# Patient Record
Sex: MALE | Race: WHITE | Employment: UNEMPLOYED | URBAN - METROPOLITAN AREA
[De-identification: names, ages, dates, MRNs, and addresses within clinical notes are randomized per-mention and may not be internally consistent; named-entity substitution may affect disease eponyms.]

---

## 2021-02-03 ENCOUNTER — HOSPITAL ENCOUNTER (EMERGENCY)
Age: 41
Discharge: HOME | End: 2021-02-03
Attending: EMERGENCY MEDICINE
Payer: MEDICAID

## 2021-02-03 ENCOUNTER — APPOINTMENT (OUTPATIENT)
Dept: GENERAL RADIOLOGY | Age: 41
End: 2021-02-03
Payer: MEDICAID

## 2021-02-03 VITALS
RESPIRATION RATE: 16 BRPM | HEIGHT: 68 IN | BODY MASS INDEX: 23.49 KG/M2 | OXYGEN SATURATION: 100 % | TEMPERATURE: 98.1 F | WEIGHT: 155 LBS | HEART RATE: 75 BPM | SYSTOLIC BLOOD PRESSURE: 140 MMHG | DIASTOLIC BLOOD PRESSURE: 86 MMHG

## 2021-02-03 DIAGNOSIS — T14.8XXA PAIN DUE TO FRACTURE: Primary | ICD-10-CM

## 2021-02-03 PROCEDURE — 6370000000 HC RX 637 (ALT 250 FOR IP): Performed by: EMERGENCY MEDICINE

## 2021-02-03 PROCEDURE — 73610 X-RAY EXAM OF ANKLE: CPT

## 2021-02-03 PROCEDURE — 99283 EMERGENCY DEPT VISIT LOW MDM: CPT

## 2021-02-03 PROCEDURE — 73590 X-RAY EXAM OF LOWER LEG: CPT

## 2021-02-03 RX ORDER — OXYCODONE HYDROCHLORIDE AND ACETAMINOPHEN 5; 325 MG/1; MG/1
1-2 TABLET ORAL EVERY 6 HOURS PRN
Qty: 6 TABLET | Refills: 0 | Status: SHIPPED | OUTPATIENT
Start: 2021-02-03 | End: 2021-02-10

## 2021-02-03 RX ORDER — OXYCODONE HYDROCHLORIDE AND ACETAMINOPHEN 5; 325 MG/1; MG/1
1 TABLET ORAL ONCE
Status: COMPLETED | OUTPATIENT
Start: 2021-02-03 | End: 2021-02-03

## 2021-02-03 RX ORDER — LIDOCAINE 50 MG/G
1 PATCH TOPICAL DAILY
Qty: 12 PATCH | Refills: 0 | Status: SHIPPED | OUTPATIENT
Start: 2021-02-03

## 2021-02-03 RX ADMIN — OXYCODONE HYDROCHLORIDE AND ACETAMINOPHEN 1 TABLET: 5; 325 TABLET ORAL at 20:40

## 2021-02-03 ASSESSMENT — PAIN SCALES - GENERAL
PAINLEVEL_OUTOF10: 8
PAINLEVEL_OUTOF10: 8

## 2021-02-03 ASSESSMENT — PAIN DESCRIPTION - LOCATION: LOCATION: ANKLE

## 2021-02-03 ASSESSMENT — PAIN DESCRIPTION - PAIN TYPE: TYPE: ACUTE PAIN

## 2021-02-04 NOTE — ED NOTES
Pt dc/d with instructions and rx in stable condition to lobby per w/c. Home per ride.       Michael Pascual, ROMEO  02/03/21 8981

## 2021-02-04 NOTE — ED NOTES
Pt to ed with c/o slipped on ice with crutches, c/o rt ankle pain, recent surgery.  Exam per MD.      Pratik Guajardo RN  02/03/21 2045

## 2021-02-04 NOTE — ED PROVIDER NOTES
[address]   eMERGENCY dEPARTMENT eNCOUnter      Pt Name: Maria Luisa Pepe  MRN: [id number]  [place] 1980  Date of evaluation: 2/3/2021  Provider: Ayden Rosenbaum MD  PCP: Hung Son      CHIEF COMPLAINT       Leg pain    HISTORY OFPRESENT ILLNESS   (Location/Symptom, Timing/Onset, Context/Setting, Quality, Duration, Modifying Factors,Severity)  Note limiting factors. Maria Luisa Pepe is a 36 y.o. male presents after falling and reinjuring his already surgically repaired leg he also seems to express some concern about being seen by the orthopedic surgeons at [facility] where he says that they told him he would need a bone graft he is in a walking boot and ambulated in here without difficulty    Nursing Notes were all reviewed and agreed with or any disagreements were addressed  in the HPI. REVIEW OF SYSTEMS    (2-9 systems for level 4, 10 or more for level 5)     Review of Systems    Positives and Pertinent negatives as per HPI. Except as noted above in the ROS, all other systems were reviewed andnegative. PASTMEDICAL HISTORY     Past Medical History:   Diagnosis Date    Anxiety     Asthma     Bowel obstruction (HCC)     Depression     GERD (gastroesophageal reflux disease)     IBS (irritable bowel syndrome)     Mandible fracture (HCC)     PTSD (post-traumatic stress disorder)          SURGICAL HISTORY       Past Surgical History:   Procedure Laterality Date    ABDOMEN SURGERY  2002    bowel resection    FRACTURE SURGERY      mandibular    HERNIA REPAIR      R inguinal hernia         CURRENT MEDICATIONS       Previous Medications    BUSPIRONE (BUSPAR) 15 MG TABLET    Take 15 mg by mouth 3 times daily. GABAPENTIN (NEURONTIN) 400 MG CAPSULE    Take 800 mg by mouth 2 times daily. OMEPRAZOLE (PRILOSEC) 20 MG CAPSULE    Take 40 mg by mouth 2 times daily.        ALLERGIES     Clindamycin/lincomycin, Ibuprofen, Morphine, Morphine hcl, and Nsaids    FAMILY HISTORY     History reviewed. No pertinent family history. SOCIAL HISTORY       Social History     Socioeconomic History    Marital status: Single     Spouse name: None    Number of children: None    Years of education: None    Highest education level: None   Occupational History    None   Social Needs    Financial resource strain: None    Food insecurity     Worry: None     Inability: None    Transportation needs     Medical: None     Non-medical: None   Tobacco Use    Smoking status: Current Every Day Smoker     Packs/day: 0.20     Years: 15.00     Pack years: 3.00     Types: Cigarettes    Smokeless tobacco: Never Used   Substance and Sexual Activity    Alcohol use: Yes     Comment: occassionally    Drug use: No     Types: Marijuana     Comment: occassionally- not recently    Sexual activity: None   Lifestyle    Physical activity     Days per week: None     Minutes per session: None    Stress: None   Relationships    Social connections     Talks on phone: None     Gets together: None     Attends Restoration service: None     Active member of club or organization: None     Attends meetings of clubs or organizations: None     Relationship status: None    Intimate partner violence     Fear of current or ex partner: None     Emotionally abused: None     Physically abused: None     Forced sexual activity: None   Other Topics Concern    None   Social History Narrative    None       SCREENINGS      @FLOW([id number])@      PHYSICAL EXAM    (up to 7 for level 4, 8 or more for level 5)     ED Triage Vitals [02/03/21 1945]   BP Temp Temp Source Pulse Resp SpO2 Height Weight   (!) 140/86 98.1 °F (36.7 °C) Oral 75 16 100 % 5' 8\" (1.727 m) 155 lb (70.3 kg)       Physical Exam      General Appearance:  Alert, cooperative, no distress, appears stated age. Head:  Normocephalic, without obviousabnormality, atraumatic. Eyes:  conjunctiva/corneas clear, EOM's intact. Sclera anicteric.    ENT: and screw fixation of a distal fibular diaphyseal fracture. There is only some minimal bridging osseous callus formation identified along the lateral margin of the fibular fracture. IMPRESSION:   1. The patient has undergone medial blade plate and screw fixation of distal fibular diaphyseal oblique fracture. There is a persistent fracture cleft with no solid osseous bridging across the fracture line. No new fracture is identified. 2. There is plate and screw fixation of a distal fibular diaphyseal fracture. There is only some minimal bridging osseous callus formation identified along the lateral margin of the fibular fracture. PROCEDURES   Unless otherwise noted below, none     Procedures    *    CRITICAL CARE TIME   N/A      EMERGENCY DEPARTMENT COURSE and DIFFERENTIALDIAGNOSIS/MDM:   Vitals:    Vitals:    02/03/21 1945   BP: (!) 140/86   Pulse: 75   Resp: 16   Temp: 98.1 °F (36.7 °C)   TempSrc: Oral   SpO2: 100%   Weight: 155 lb (70.3 kg)   Height: 5' 8\" (1.727 m)       Patient was given thefollowing medications:  Medications   oxyCODONE-acetaminophen (PERCOCET) 5-325 MG per tablet 1 tablet (1 tablet Oral Given 2/3/21 2040)           The patient tolerated their visit well. The patient and / or the familywere informed of the results of any tests, a time was given to answer questions. FINAL IMPRESSION      1. Pain due to fracture          DISPOSITION/PLAN   DISPOSITION Decision To Discharge 02/03/2021 08:39:47 PM      PATIENT REFERRED TO:  Isidra Tomlin MD  [address] [place] [address]  [phone]    In 3 days        DISCHARGE MEDICATIONS:  New Prescriptions    LIDOCAINE (LIDODERM) 5 %    Place 1 patch onto the skin daily 12 hours on, 12 hours off. OXYCODONE-ACETAMINOPHEN (PERCOCET) 5-325 MG PER TABLET    Take 1-2 tablets by mouth every 6 hours as needed for Pain for up to 7 days.        DISCONTINUED MEDICATIONS:  Discontinued Medications    ALBUTEROL (PROVENTIL HFA;VENTOLIN HFA) 108 (90 BASE) MCG/ACT INHALER    Inhale 2 puffs into the lungs every 6 hours as needed. BUPROPION (WELLBUTRIN XL) 300 MG XL TABLET    Take 300 mg by mouth every morning. DIPHENHYDRAMINE (BENADRYL) 50 MG CAPSULE    Take 50 mg by mouth nightly as needed for Itching. OLANZAPINE (ZYPREXA) 5 MG TABLET    Take 5 mg by mouth 2 times daily. PANTOPRAZOLE SODIUM (PROTONIX) 40 MG PACK PACKET    Take 40 mg by mouth daily. QUETIAPINE (SEROQUEL) 400 MG TABLET    Take 400 mg by mouth nightly. SERTRALINE (ZOLOFT) 100 MG TABLET    Take 150 mg by mouth daily.               (Please note that portions of this note were completed with a voice recognition program.  Efforts were made to edit the dictations but occasionally words are mis-transcribed.)    Marialuisa Shafer MD (electronically signed)      Marialuisa Shafer MD  02/03/21 204

## 2022-01-28 ENCOUNTER — HOSPITAL ENCOUNTER (EMERGENCY)
Age: 42
Discharge: HOME | End: 2022-01-28
Attending: EMERGENCY MEDICINE
Payer: MEDICAID

## 2022-01-28 ENCOUNTER — APPOINTMENT (OUTPATIENT)
Dept: GENERAL RADIOLOGY | Age: 42
End: 2022-01-28
Payer: MEDICAID

## 2022-01-28 VITALS
RESPIRATION RATE: 14 BRPM | OXYGEN SATURATION: 98 % | TEMPERATURE: 98.4 F | HEIGHT: 68 IN | DIASTOLIC BLOOD PRESSURE: 106 MMHG | HEART RATE: 98 BPM | BODY MASS INDEX: 24.25 KG/M2 | SYSTOLIC BLOOD PRESSURE: 185 MMHG | WEIGHT: 160 LBS

## 2022-01-28 DIAGNOSIS — S99.911A INJURY OF RIGHT ANKLE, INITIAL ENCOUNTER: Primary | ICD-10-CM

## 2022-01-28 DIAGNOSIS — T85.848A PAIN FROM IMPLANTED HARDWARE, INITIAL ENCOUNTER: ICD-10-CM

## 2022-01-28 PROCEDURE — 99284 EMERGENCY DEPT VISIT MOD MDM: CPT

## 2022-01-28 PROCEDURE — 73502 X-RAY EXAM HIP UNI 2-3 VIEWS: CPT

## 2022-01-28 PROCEDURE — 6360000002 HC RX W HCPCS: Performed by: EMERGENCY MEDICINE

## 2022-01-28 PROCEDURE — 6370000000 HC RX 637 (ALT 250 FOR IP): Performed by: EMERGENCY MEDICINE

## 2022-01-28 PROCEDURE — 73610 X-RAY EXAM OF ANKLE: CPT

## 2022-01-28 PROCEDURE — 96372 THER/PROPH/DIAG INJ SC/IM: CPT

## 2022-01-28 RX ORDER — OMEPRAZOLE 20 MG/1
20 CAPSULE, DELAYED RELEASE ORAL
Qty: 60 CAPSULE | Refills: 0 | Status: SHIPPED | OUTPATIENT
Start: 2022-01-28

## 2022-01-28 RX ORDER — FAMOTIDINE 20 MG/1
20 TABLET, FILM COATED ORAL ONCE
Status: COMPLETED | OUTPATIENT
Start: 2022-01-28 | End: 2022-01-28

## 2022-01-28 RX ORDER — OXYCODONE HYDROCHLORIDE AND ACETAMINOPHEN 5; 325 MG/1; MG/1
1 TABLET ORAL ONCE
Status: COMPLETED | OUTPATIENT
Start: 2022-01-28 | End: 2022-01-28

## 2022-01-28 RX ORDER — KETOROLAC TROMETHAMINE 30 MG/ML
15 INJECTION, SOLUTION INTRAMUSCULAR; INTRAVENOUS ONCE
Status: DISCONTINUED | OUTPATIENT
Start: 2022-01-28 | End: 2022-01-28 | Stop reason: HOSPADM

## 2022-01-28 RX ORDER — LIDOCAINE 4 G/G
1 PATCH TOPICAL ONCE
Status: DISCONTINUED | OUTPATIENT
Start: 2022-01-28 | End: 2022-01-28 | Stop reason: HOSPADM

## 2022-01-28 RX ORDER — TRAMADOL HYDROCHLORIDE 50 MG/1
50 TABLET ORAL EVERY 6 HOURS PRN
Qty: 8 TABLET | Refills: 0 | Status: SHIPPED | OUTPATIENT
Start: 2022-01-28 | End: 2022-01-31

## 2022-01-28 RX ORDER — ONDANSETRON 4 MG/1
4 TABLET, ORALLY DISINTEGRATING ORAL EVERY 8 HOURS PRN
Qty: 20 TABLET | Refills: 0 | Status: SHIPPED | OUTPATIENT
Start: 2022-01-28

## 2022-01-28 RX ORDER — TIZANIDINE 4 MG/1
4 TABLET ORAL EVERY 6 HOURS PRN
Qty: 20 TABLET | Refills: 0 | Status: SHIPPED | OUTPATIENT
Start: 2022-01-28

## 2022-01-28 RX ORDER — NAPROXEN 500 MG/1
500 TABLET ORAL 2 TIMES DAILY WITH MEALS
Qty: 60 TABLET | Refills: 5 | Status: SHIPPED | OUTPATIENT
Start: 2022-01-28

## 2022-01-28 RX ORDER — ORPHENADRINE CITRATE 30 MG/ML
60 INJECTION INTRAMUSCULAR; INTRAVENOUS ONCE
Status: COMPLETED | OUTPATIENT
Start: 2022-01-28 | End: 2022-01-28

## 2022-01-28 RX ADMIN — FAMOTIDINE 20 MG: 20 TABLET ORAL at 03:28

## 2022-01-28 RX ADMIN — ORPHENADRINE CITRATE 60 MG: 30 INJECTION INTRAMUSCULAR; INTRAVENOUS at 03:28

## 2022-01-28 RX ADMIN — OXYCODONE AND ACETAMINOPHEN 1 TABLET: 5; 325 TABLET ORAL at 04:18

## 2022-01-28 ASSESSMENT — PAIN DESCRIPTION - FREQUENCY: FREQUENCY: CONTINUOUS

## 2022-01-28 ASSESSMENT — PAIN SCALES - GENERAL
PAINLEVEL_OUTOF10: 10

## 2022-01-28 ASSESSMENT — PAIN DESCRIPTION - ORIENTATION: ORIENTATION: RIGHT

## 2022-01-28 ASSESSMENT — PAIN DESCRIPTION - DESCRIPTORS: DESCRIPTORS: THROBBING;SHOOTING

## 2022-01-28 ASSESSMENT — PAIN DESCRIPTION - LOCATION: LOCATION: BACK;LEG;ANKLE

## 2022-01-28 ASSESSMENT — PAIN DESCRIPTION - PAIN TYPE: TYPE: ACUTE PAIN

## 2022-01-28 NOTE — ED NOTES
Patient prepared for and ready to be discharged. Patient discharged at this time in no acute distress after verbalizing understanding of discharge instructions. Patient left after receiving After Visit Summary instructions.         Maricarmen Pierre RN  01/28/22 3659

## 2022-01-29 ASSESSMENT — ENCOUNTER SYMPTOMS
BACK PAIN: 1
COLOR CHANGE: 0

## 2022-01-29 NOTE — ED PROVIDER NOTES
[address]  [facility]      Pt Name: Paul Villa  MRN: [id number]  [place] 1980  Date ofevaluation: 1/28/2022  Provider: Vishal Mendoza MD    CHIEF COMPLAINT       Chief Complaint   Patient presents with    Back Pain    Fall    Leg Pain         HISTORY OF PRESENT ILLNESS   (Location/Symptom, Timing/Onset,Context/Setting, Quality, Duration, Modifying Factors, Severity)  Note limiting factors. Paul Villa is a 39 y.o. male  who  has a past medical history of Anxiety, Asthma, Bowel obstruction ([address] Depression, GERD (gastroesophageal reflux disease), IBS (irritable bowel syndrome), Mandible fracture ([address] and PTSD (post-traumatic stress disorder). who presents to the emergency department for evaluation of right ankle pain and left hip pain after fall. Patient reports that he was walking down steps and slipped causing him to fall and injure his left hip and right ankle. Reports a history of previous right ankle fracture. Reports that he has been on the bear weight but does worsen the pain. Denies numbness weakness or loss sensation. Denies changes in bowel or urine function. Denies head injury loss of consciousness. Reports he did take over-the-counter occasions with improvement of his symptoms. Patient reports that injury occurred around 1600 this afternoon. Patient was previously being seen by orthopedics at [facility] due to a complicated right ankle fracture. Reports that he has not been able to follow-up because he is recently incarcerated and only recently was able to obtain insurance. HPI    NursingNotes were reviewed. REVIEW OF SYSTEMS    (2-9 systems for level 4, 10 or more for level 5)     Review of Systems   Musculoskeletal: Positive for arthralgias, back pain and myalgias. Negative for neck pain and neck stiffness. Skin: Negative for color change, pallor and wound.    Neurological: Negative for syncope, light-headedness and numbness. Except as noted above the remainder of the review of systems was reviewed and negative. PAST MEDICAL HISTORY     Past Medical History:   Diagnosis Date    Anxiety     Asthma     Bowel obstruction (HCC)     Depression     GERD (gastroesophageal reflux disease)     IBS (irritable bowel syndrome)     Mandible fracture (HCC)     PTSD (post-traumatic stress disorder)          SURGICALHISTORY       Past Surgical History:   Procedure Laterality Date    ABDOMEN SURGERY  2002    bowel resection    FRACTURE SURGERY      mandibular    HERNIA REPAIR      R inguinal hernia         CURRENT MEDICATIONS       Discharge Medication List as of 1/28/2022  4:23 AM      CONTINUE these medications which have NOT CHANGED    Details   lidocaine (LIDODERM) 5 % Place 1 patch onto the skin daily 12 hours on, 12 hours off., Disp-12 patch, R-0Normal      busPIRone (BUSPAR) 15 MG tablet Take 15 mg by mouth 3 times daily. gabapentin (NEURONTIN) 400 MG capsule Take 800 mg by mouth 2 times daily. !! omeprazole (PRILOSEC) 20 MG capsule Take 40 mg by mouth 2 times daily. !! - Potential duplicate medications found. Please discuss with provider. Clindamycin/lincomycin, Ibuprofen, Morphine, Morphine hcl, and Nsaids    FAMILY HISTORY     History reviewed. No pertinent family history.        SOCIAL HISTORY       Social History     Socioeconomic History    Marital status: Single     Spouse name: None    Number of children: None    Years of education: None    Highest education level: None   Occupational History    None   Tobacco Use    Smoking status: Current Every Day Smoker     Packs/day: 0.20     Years: 15.00     Pack years: 3.00     Types: Cigarettes    Smokeless tobacco: Never Used   Substance and Sexual Activity    Alcohol use: Yes     Comment: occassionally    Drug use: No     Types: Marijuana (Weed)     Comment: occassionally- not recently    Sexual activity: None   Other Topics Concern    None   Social History Narrative    None     Social Determinants of Health     Financial Resource Strain:     Difficulty of Paying Living Expenses: Not on file   Food Insecurity:     Worried About Running Out of Food in the Last Year: Not on file    Jeanna of Food in the Last Year: Not on file   Transportation Needs:     Lack of Transportation (Medical): Not on file    Lack of Transportation (Non-Medical): Not on file   Physical Activity:     Days of Exercise per Week: Not on file    Minutes of Exercise per Session: Not on file   Stress:     Feeling of Stress : Not on file   Social Connections:     Frequency of Communication with Friends and Family: Not on file    Frequency of Social Gatherings with Friends and Family: Not on file    Attends Latter-day Services: Not on file    Active Member of [address] [place] [address] [employer] or Organizations: Not on file    Attends Club or Organization Meetings: Not on file    Marital Status: Not on file   Intimate Partner Violence:     Fear of Current or Ex-Partner: Not on file    Emotionally Abused: Not on file    Physically Abused: Not on file    Sexually Abused: Not on file   Housing Stability:     Unable to Pay for Housing in the Last Year: Not on file    Number of [place] in the Last Year: Not on file    Unstable Housing in the Last Year: Not on file       SCREENINGS             PHYSICAL EXAM    (up to 7 for level 4, 8 or more for level 5)     ED Triage Vitals [01/28/22 0250]   BP Temp Temp Source Pulse Resp SpO2 Height Weight   (!) 185/106 98.4 °F (36.9 °C) Oral 98 14 98 % 5' 8\" (1.727 m) 160 lb (72.6 kg)       Physical Exam  Vitals and nursing note reviewed. Constitutional:       General: He is not in acute distress. Appearance: He is well-developed. HENT:      Head: Normocephalic and atraumatic. Eyes:      Conjunctiva/sclera: Conjunctivae normal.   Neck:      Trachea: No tracheal deviation.    Cardiovascular:      Rate and Rhythm: Normal rate and regular rhythm. Pulmonary:      Effort: Pulmonary effort is normal.      Breath sounds: Normal breath sounds. No wheezing or rales. Abdominal:      General: There is no distension. Palpations: Abdomen is soft. Tenderness: There is no abdominal tenderness. Musculoskeletal:         General: Swelling, tenderness and signs of injury present. No deformity. Normal range of motion. Cervical back: Normal range of motion. Right lower leg: Edema present. Legs:    Skin:     General: Skin is warm and dry. Findings: No bruising, erythema or lesion. Neurological:      Mental Status: He is alert and oriented to person, place, and time. RESULTS     EKG: All EKG's are interpreted by the Emergency Department Physician who either signs or Co-signsthis chart in the absence of a cardiologist.        RADIOLOGY:   Aiyana Ruiz such as CT, Ultrasound and MRI are read by the radiologist. Plain radiographic images are visualized and preliminarily interpreted by the emergency physician with the below findings:        Interpretation per the Radiologist below, if available at the time ofthis note:    XR HIP 2-3 VW W PELVIS LEFT   Final Result     No evidence of acute fracture or dislocation. XR ANKLE RIGHT (MIN 3 VIEWS)   Final Result   1. Plate and screw fixation of a distal tibial fracture. New    finding of 3 fractured and displaced tibial diaphyseal screws. Widened, irregular appearance of the fracture site. Somewhat    similar although more irregularity of the hazy fracture edges. On    the lateral view, increased angulation and mild displacement at    the tibial fracture site. Lucency around the tibial plate. Findings may reflect loosening/infection. 2.  Plate and screw fixation of distal fibular diaphyseal fracture    with interval increased callus and bony bridging. Fibular    hardware appears intact.                 ED BEDSIDE ULTRASOUND:   Performed by ED Physician - none    LABS:  Labs Reviewed - No data to display    All other labs were within normal range or not returned as of this dictation. EMERGENCY DEPARTMENT COURSE and DIFFERENTIAL DIAGNOSIS/MDM:   Vitals:    Vitals:    01/28/22 0250   BP: (!) 185/106   Pulse: 98   Resp: 14   Temp: 98.4 °F (36.9 °C)   TempSrc: Oral   SpO2: 98%   Weight: 160 lb (72.6 kg)   Height: 5' 8\" (1.727 m)       Patient was given thefollowing medications:  Medications   famotidine (PEPCID) tablet 20 mg (20 mg Oral Given 1/28/22 0328)   orphenadrine (NORFLEX) injection 60 mg (60 mg IntraMUSCular Given 1/28/22 0328)   oxyCODONE-acetaminophen (PERCOCET) 5-325 MG per tablet 1 tablet (1 tablet Oral Given 1/28/22 0418)       ED COURSE & MEDICAL DECISION MAKING    Pertinent Labs & Imaging studies reviewed. (See chart for details)   -  Patient seen and evaluated in the emergency department. -  Triage and nursing notes reviewed and incorporated. -  Old chart records reviewed and incorporated. -  Differential diagnosis includes: Differential diagnosis: includes but not limited to Arterial Injury/Ischemia, Fracture, Dislocation, Infection, Compartment Syndrome, Neurologic Deficit/Injury. -  Work-up included:  See above  -  ED treatment included: See above  -  Results discussed with patient. Patient presents the ED for evaluation of right ankle pain and left hip pain after a fall. On exam patient does have tenderness to the proximal portion of the lateral aspect of the buttock. No midline lumbar tenderness. No signs or symptoms concerning for cord compression. Patient does have a diffusely tender ankle. PT and DP pulses intact. Capillary refill less than 3 seconds in the digits. Previous scar from internal fixation noted. imaging studies show fractures of previous implanted screws as of reported haziness around hardware. Suspect secondary to recent trauma. Patient placed in a walking boot and on crutches.   He was provided with analgesics. Patient had refused Toradol in the emergency department claiming that it causes him to feel sick and he has a history of ulcers. He was provided with Pepcid and antiemetics. Patient was given 1 Percocet the emergency department. He reports he is recovering addict. Patient was written for tramadol. He reports that tramadol upsets his stomach and causes nausea was also provided with omeprazole Pepcid and Zofran. He left without the tramadol prescription. He was encouraged to follow-up with the orthopedist at [facility] who had seen him previously where he has establish care. Patient feels improved on reevaluation. Symptomatic treatment with expectant management discussed with the patient and they and/or family members present are amenable to treatment plan and outpatient follow-up. Strict return precautions were discussed with the patient and those present. They demonstrated understanding of when to return to the emergency department for new or worsening symptoms. .  The patient is agreeable with plan of care and disposition. REASSESSMENT          CRITICAL CARE TIME   Total Critical Care time was 0 minutes, excluding separately reportable procedures. There was a high probability of clinically significant/life threatening deterioration in the patient's condition which required my urgent intervention. CONSULTS:  None    PROCEDURES:  Unless otherwise noted below, none     Procedures    FINAL IMPRESSION      1. Injury of right ankle, initial encounter    2.  Pain from implanted hardware, initial encounter          DISPOSITION/PLAN   DISPOSITION Decision To Discharge 01/28/2022 04:06:46 AM      PATIENT REFERREDTO:  Maria Alejandra Munroe    Schedule an appointment as soon as possible for a visit   As needed    [address] Pre-Services  [phone]  Schedule an appointment as soon as possible for a visit   As needed    Maria Alejandra Munroe    Schedule an appointment as soon as possible for a visit   As needed    Jose Daniel Funes MD  [address] [facility] [address] [facility] [address]  [address]            DISCHARGEMEDICATIONS:  Discharge Medication List as of 1/28/2022  4:23 AM      START taking these medications    Details   traMADol (ULTRAM) 50 MG tablet Take 1 tablet by mouth every 6 hours as needed for Pain for up to 3 days. Intended supply: 3 days. Take lowest dose possible to manage pain, Disp-8 tablet, R-0Print      naproxen (NAPROSYN) 500 MG tablet Take 1 tablet by mouth 2 times daily (with meals), Disp-60 tablet, R-5Normal      !! omeprazole (PRILOSEC) 20 MG delayed release capsule Take 1 capsule by mouth 2 times daily (before meals), Disp-60 capsule, R-0Normal      ondansetron (ZOFRAN ODT) 4 MG disintegrating tablet Take 1 tablet by mouth every 8 hours as needed for Nausea, Disp-20 tablet, R-0Normal      tiZANidine (ZANAFLEX) 4 MG tablet Take 1 tablet by mouth every 6 hours as needed (muscle aches), Disp-20 tablet, R-0Normal       !! - Potential duplicate medications found. Please discuss with provider.              (Please note that portions of this note were completed with a voice recognition program.  Efforts were made to edit the dictations but occasionally words are mis-transcribed.)    Amy Dozier MD (electronically signed)  Attending Emergency Physician          Amy Dozier MD  01/29/22 [address], [place]  01/29/22 2009

## 2022-06-09 ENCOUNTER — HOSPITAL ENCOUNTER (EMERGENCY)
Age: 42
Discharge: HOME | End: 2022-06-09
Attending: EMERGENCY MEDICINE
Payer: MEDICAID

## 2022-06-09 VITALS
SYSTOLIC BLOOD PRESSURE: 148 MMHG | DIASTOLIC BLOOD PRESSURE: 106 MMHG | RESPIRATION RATE: 16 BRPM | OXYGEN SATURATION: 99 %

## 2022-06-09 DIAGNOSIS — R21 RASH: Primary | ICD-10-CM

## 2022-06-09 PROCEDURE — 6370000000 HC RX 637 (ALT 250 FOR IP): Performed by: EMERGENCY MEDICINE

## 2022-06-09 PROCEDURE — 99283 EMERGENCY DEPT VISIT LOW MDM: CPT

## 2022-06-09 RX ORDER — METRONIDAZOLE 500 MG/1
500 TABLET ORAL 2 TIMES DAILY
Qty: 20 TABLET | Refills: 0 | Status: SHIPPED | OUTPATIENT
Start: 2022-06-09 | End: 2022-06-19

## 2022-06-09 RX ORDER — NAPROXEN 500 MG/1
500 TABLET ORAL 2 TIMES DAILY
Qty: 20 TABLET | Refills: 0 | Status: SHIPPED | OUTPATIENT
Start: 2022-06-09 | End: 2022-06-19

## 2022-06-09 RX ORDER — NAPROXEN 500 MG/1
500 TABLET ORAL ONCE
Status: DISCONTINUED | OUTPATIENT
Start: 2022-06-09 | End: 2022-06-09 | Stop reason: HOSPADM

## 2022-06-09 RX ORDER — METRONIDAZOLE 500 MG/1
500 TABLET ORAL ONCE
Status: COMPLETED | OUTPATIENT
Start: 2022-06-09 | End: 2022-06-09

## 2022-06-09 RX ADMIN — METRONIDAZOLE 500 MG: 500 TABLET ORAL at 04:32

## 2022-06-09 NOTE — ED NOTES
Patient given d/c instructions. Patient states that this is a \"[facility]\" Emphasis placed on getting f/u with PCP. Reviewed medication with pt and given instructions on use. Pt ambulated to lobby with steady gait.      Tamar Graf RN  06/09/22 9909

## 2022-06-09 NOTE — ED NOTES
Patient states that he pulled a worm out of his nose earlier today. + multiple open sores on body, patient continually scratching and squeezing them.      Praveena Hummel RN  06/09/22 8348

## 2022-06-09 NOTE — ED PROVIDER NOTES
[facility]   eMERGENCY dEPARTMENT eNCOUnter      Pt Name: Emili Sharpe  MRN: [id number]  [place] 1980  Date of evaluation: 6/9/2022  Provider: Aviva Sawyer MD  PCP: No primary care provider on file. CHIEF COMPLAINT       Chief Complaint   Patient presents with    Rash       HISTORY OFPRESENT ILLNESS   (Location/Symptom, Timing/Onset, Context/Setting, Quality, Duration, Modifying Factors,Severity)  Note limiting factors. Emili Sharpe is a 39 y.o. male  has a chronically injured right ankle and has had surgery on that ankle and he was walking and was complaining that he had pain he denies any new injury does not want any x-rays he also thinks that he has a parasitic infection and thinks that he pulled a parasite out of his nose    Nursing Notes were all reviewed and agreed with or any disagreements were addressed  in the HPI. REVIEW OF SYSTEMS    (2-9 systems for level 4, 10 or more for level 5)     Review of Systems    Positives and Pertinent negatives as per HPI. Except as noted above in the ROS, all other systems were reviewed andnegative. PASTMEDICAL HISTORY   No past medical history on file. SURGICAL HISTORY     No past surgical history on file. CURRENT MEDICATIONS       Previous Medications    No medications on file       ALLERGIES     Patient has no known allergies. FAMILY HISTORY     No family history on file.        SOCIAL HISTORY       Social History     Socioeconomic History    Marital status: Not on file     Spouse name: Not on file    Number of children: Not on file    Years of education: Not on file    Highest education level: Not on file   Occupational History    Not on file   Tobacco Use    Smoking status: Not on file    Smokeless tobacco: Not on file   Substance and Sexual Activity    Alcohol use: Not on file    Drug use: Not on file    Sexual activity: Not on file   Other Topics Concern    Not on file   Social History Narrative    Not on file     Social Determinants of Health     Financial Resource Strain:     Difficulty of Paying Living Expenses: Not on file   Food Insecurity:     Worried About Running Out of Food in the Last Year: Not on file    Ugo of Food in the Last Year: Not on file   Transportation Needs:     Lack of Transportation (Medical): Not on file    Lack of Transportation (Non-Medical): Not on file   Physical Activity:     Days of Exercise per Week: Not on file    Minutes of Exercise per Session: Not on file   Stress:     Feeling of Stress : Not on file   Social Connections:     Frequency of Communication with Friends and Family: Not on file    Frequency of Social Gatherings with Friends and Family: Not on file    Attends Gnosticist Services: Not on file    Active Member of [address] [place] [address] [email or web address] or Organizations: Not on file    Attends Club or Organization Meetings: Not on file    Marital Status: Not on file   Intimate Partner Violence:     Fear of Current or Ex-Partner: Not on file    Emotionally Abused: Not on file    Physically Abused: Not on file    Sexually Abused: Not on file   Housing Stability:     Unable to Pay for Housing in the Last Year: Not on file    Number of [place] in the Last Year: Not on file    Unstable Housing in the Last Year: Not on file       SCREENINGS    Arpan Coma Scale  Eye Opening: Spontaneous  Best Verbal Response: Oriented  Best Motor Response: Obeys commands  Arpan Coma Scale Score: 15 @FLOW([id number])@      PHYSICAL EXAM    (up to 7 for level 4, 8 or more for level 5)     ED Triage Vitals [06/09/22 0418]   BP Temp Temp src Pulse Resp SpO2 Height Weight   (!) 148/106 -- -- -- 16 99 % -- --       Physical Exam      General Appearance:  Alert, cooperative, no distress, appears stated age. Head:  Normocephalic, without obviousabnormality, atraumatic. Eyes:  conjunctiva/corneas clear, EOM's intact. Sclera anicteric.    ENT: Mucous membranes moist.   Neck: Supple, symmetrical, trachea midline, no adenopathy. No jugular venous distention. Lungs:   Clear to auscultation bilaterally, respirationsunlabored. No rales, rhonchi or wheezes. Chest Wall:  No tenderness. Heart:  Regular rate and rhythm, S1 and S2 normal, no murmur, rub or gallop. Abdomen:   Soft, non-tender, bowel sounds active,   no masses, no organomegaly. Extremities: No edema, cords or calf tenderness. Full range of motion. Pulses: 2+ and symmetric   Skin: Turgor is normal, no rashes or lesions. Neurologic: Alert and oriented X 3. No focal findings. Motor grossly normal.  Speech clear, no drift, CN III-XII grossly intact,        DIAGNOSTIC RESULTS   LABS:    Labs Reviewed - No data to display    All other labs were within normal range or not returned as of this dictation. EKG: All EKG's are interpreted by the Emergency Department Physician who eithersigns or Co-signs this chart in the absence of a cardiologist.        RADIOLOGY:   Non-plain film images such as CT, Ultrasound and MRI are read by the radiologist. Plain radiographic images are visualized by myself. *    Interpretation per the Radiologist below, if available at the time of this note:    No orders to display         PROCEDURES   Unless otherwise noted below, none     Procedures    *    CRITICAL CARE TIME   N/A      EMERGENCY DEPARTMENT COURSE and DIFFERENTIALDIAGNOSIS/MDM:   Vitals:    Vitals:    06/09/22 0418   BP: (!) 148/106   Resp: 16   SpO2: 99%       Patient was given thefollowing medications:  Medications   naproxen (NAPROSYN) tablet 500 mg (has no administration in time range)   metroNIDAZOLE (FLAGYL) tablet 500 mg (has no administration in time range)           The patient tolerated their visit well. The patient and / or the familywere informed of the results of any tests, a time was given to answer questions. FINAL IMPRESSION      1.  Rash          DISPOSITION/PLAN   DISPOSITION Discharge - Pending Orders Complete 06/09/2022 04:22:02 AM  Plan will be for discharge with pain medication and Flagyl    PATIENT REFERRED TO:  Dermatology clinic at [address] [place] [address] Dr:  New Prescriptions    METRONIDAZOLE (FLAGYL) 500 MG TABLET    Take 1 tablet by mouth 2 times daily for 10 days    NAPROXEN (NAPROSYN) 500 MG TABLET    Take 1 tablet by mouth 2 times daily for 20 doses       DISCONTINUED MEDICATIONS:  Discontinued Medications    No medications on file              (Please note that portions of this note were completed with a voice recognition program.  Efforts were made to edit the dictations but occasionally words are mis-transcribed.)    Beti Madrigal MD (electronically signed)      Beti Madrigal MD  06/09/22 700 Mina Kimball MD  06/09/22 0046

## 2022-07-29 ENCOUNTER — APPOINTMENT (OUTPATIENT)
Dept: GENERAL RADIOLOGY | Age: 42
End: 2022-07-29
Payer: MEDICAID

## 2022-07-29 ENCOUNTER — APPOINTMENT (OUTPATIENT)
Dept: CT IMAGING | Age: 42
End: 2022-07-29
Payer: MEDICAID

## 2022-07-29 ENCOUNTER — HOSPITAL ENCOUNTER (EMERGENCY)
Age: 42
Discharge: HOME | End: 2022-07-29
Attending: STUDENT IN AN ORGANIZED HEALTH CARE EDUCATION/TRAINING PROGRAM
Payer: MEDICAID

## 2022-07-29 VITALS
TEMPERATURE: 98.2 F | HEART RATE: 58 BPM | SYSTOLIC BLOOD PRESSURE: 100 MMHG | OXYGEN SATURATION: 100 % | RESPIRATION RATE: 13 BRPM | DIASTOLIC BLOOD PRESSURE: 61 MMHG

## 2022-07-29 DIAGNOSIS — R10.9 ABDOMINAL PAIN, UNSPECIFIED ABDOMINAL LOCATION: Primary | ICD-10-CM

## 2022-07-29 LAB
ALBUMIN SERPL-MCNC: 3.6 G/DL (ref 3.4–5)
ALP BLD-CCNC: 91 U/L (ref 40–129)
ALT SERPL-CCNC: 18 U/L (ref 10–40)
ANION GAP SERPL CALCULATED.3IONS-SCNC: 10 MMOL/L (ref 3–16)
AST SERPL-CCNC: 24 U/L (ref 15–37)
BASOPHILS ABSOLUTE: 0.1 K/UL (ref 0–0.2)
BASOPHILS RELATIVE PERCENT: 1.1 %
BILIRUB SERPL-MCNC: <0.2 MG/DL (ref 0–1)
BILIRUBIN DIRECT: <0.2 MG/DL (ref 0–0.3)
BILIRUBIN URINE: NEGATIVE
BILIRUBIN, INDIRECT: NORMAL MG/DL (ref 0–1)
BLOOD, URINE: NEGATIVE
BUN BLDV-MCNC: 17 MG/DL (ref 7–20)
CALCIUM SERPL-MCNC: 9.1 MG/DL (ref 8.3–10.6)
CHLORIDE BLD-SCNC: 102 MMOL/L (ref 99–110)
CLARITY: CLEAR
CO2: 25 MMOL/L (ref 21–32)
COLOR: YELLOW
CREAT SERPL-MCNC: 0.7 MG/DL (ref 0.9–1.3)
EOSINOPHILS ABSOLUTE: 0.1 K/UL (ref 0–0.6)
EOSINOPHILS RELATIVE PERCENT: 1.2 %
GFR AFRICAN AMERICAN: >60
GFR NON-AFRICAN AMERICAN: >60
GLUCOSE BLD-MCNC: 75 MG/DL (ref 70–99)
GLUCOSE URINE: NEGATIVE MG/DL
HCT VFR BLD CALC: 37 % (ref 40.5–52.5)
HEMOGLOBIN: 13 G/DL (ref 13.5–17.5)
INFLUENZA A: NOT DETECTED
INFLUENZA B: NOT DETECTED
KETONES, URINE: NEGATIVE MG/DL
LEUKOCYTE ESTERASE, URINE: NEGATIVE
LIPASE: 17 U/L (ref 13–60)
LYMPHOCYTES ABSOLUTE: 2 K/UL (ref 1–5.1)
LYMPHOCYTES RELATIVE PERCENT: 30.3 %
MCH RBC QN AUTO: 30 PG (ref 26–34)
MCHC RBC AUTO-ENTMCNC: 35.1 G/DL (ref 31–36)
MCV RBC AUTO: 85.5 FL (ref 80–100)
MICROSCOPIC EXAMINATION: NORMAL
MONOCYTES ABSOLUTE: 0.5 K/UL (ref 0–1.3)
MONOCYTES RELATIVE PERCENT: 8.2 %
NEUTROPHILS ABSOLUTE: 3.9 K/UL (ref 1.7–7.7)
NEUTROPHILS RELATIVE PERCENT: 59.2 %
NITRITE, URINE: NEGATIVE
PDW BLD-RTO: 13.4 % (ref 12.4–15.4)
PH UA: 6 (ref 5–8)
PLATELET # BLD: 252 K/UL (ref 135–450)
PMV BLD AUTO: 8.2 FL (ref 5–10.5)
POTASSIUM SERPL-SCNC: 4.6 MMOL/L (ref 3.5–5.1)
PROTEIN UA: NEGATIVE MG/DL
RBC # BLD: 4.33 M/UL (ref 4.2–5.9)
SARS-COV-2 RNA, RT PCR: NOT DETECTED
SODIUM BLD-SCNC: 137 MMOL/L (ref 136–145)
SPECIFIC GRAVITY UA: >=1.03 (ref 1–1.03)
TOTAL PROTEIN: 6.9 G/DL (ref 6.4–8.2)
TROPONIN: <0.01 NG/ML
URINE TYPE: NORMAL
UROBILINOGEN, URINE: 0.2 E.U./DL
WBC # BLD: 6.6 K/UL (ref 4–11)

## 2022-07-29 PROCEDURE — 84484 ASSAY OF TROPONIN QUANT: CPT

## 2022-07-29 PROCEDURE — 85025 COMPLETE CBC W/AUTO DIFF WBC: CPT

## 2022-07-29 PROCEDURE — 74177 CT ABD & PELVIS W/CONTRAST: CPT

## 2022-07-29 PROCEDURE — 71260 CT THORAX DX C+: CPT | Performed by: PHYSICIAN ASSISTANT

## 2022-07-29 PROCEDURE — 73610 X-RAY EXAM OF ANKLE: CPT

## 2022-07-29 PROCEDURE — 71046 X-RAY EXAM CHEST 2 VIEWS: CPT

## 2022-07-29 PROCEDURE — 80074 ACUTE HEPATITIS PANEL: CPT

## 2022-07-29 PROCEDURE — 80048 BASIC METABOLIC PNL TOTAL CA: CPT

## 2022-07-29 PROCEDURE — 80076 HEPATIC FUNCTION PANEL: CPT

## 2022-07-29 PROCEDURE — 6360000004 HC RX CONTRAST MEDICATION: Performed by: STUDENT IN AN ORGANIZED HEALTH CARE EDUCATION/TRAINING PROGRAM

## 2022-07-29 PROCEDURE — 83690 ASSAY OF LIPASE: CPT

## 2022-07-29 PROCEDURE — 81003 URINALYSIS AUTO W/O SCOPE: CPT

## 2022-07-29 PROCEDURE — 99285 EMERGENCY DEPT VISIT HI MDM: CPT

## 2022-07-29 PROCEDURE — 87636 SARSCOV2 & INF A&B AMP PRB: CPT

## 2022-07-29 PROCEDURE — 93005 ELECTROCARDIOGRAM TRACING: CPT | Performed by: PHYSICIAN ASSISTANT

## 2022-07-29 PROCEDURE — 73590 X-RAY EXAM OF LOWER LEG: CPT

## 2022-07-29 RX ADMIN — IOPAMIDOL 75 ML: 755 INJECTION, SOLUTION INTRAVENOUS at 20:34

## 2022-07-29 ASSESSMENT — PAIN DESCRIPTION - LOCATION: LOCATION: LEG

## 2022-07-29 ASSESSMENT — ENCOUNTER SYMPTOMS
NAUSEA: 0
COUGH: 1
VOMITING: 0
DIARRHEA: 0
CHEST TIGHTNESS: 0
ABDOMINAL PAIN: 1
SHORTNESS OF BREATH: 0
BACK PAIN: 1

## 2022-07-29 ASSESSMENT — PAIN DESCRIPTION - DESCRIPTORS: DESCRIPTORS: ACHING

## 2022-07-29 ASSESSMENT — PAIN DESCRIPTION - ORIENTATION: ORIENTATION: RIGHT

## 2022-07-29 ASSESSMENT — PAIN - FUNCTIONAL ASSESSMENT: PAIN_FUNCTIONAL_ASSESSMENT: 0-10

## 2022-07-29 ASSESSMENT — PAIN SCALES - GENERAL: PAINLEVEL_OUTOF10: 7

## 2022-07-29 ASSESSMENT — PAIN DESCRIPTION - PAIN TYPE: TYPE: ACUTE PAIN

## 2022-07-29 NOTE — ED PROVIDER NOTES
ED Attending Attestation Note     Date of evaluation: 7/29/2022    This patient was seen by the advance practice provider. I have seen and examined the patient, agree with the workup, evaluation, management and diagnosis. The care plan has been discussed. I have reviewed the ECG and concur with the HI's interpretation. My assessment reveals 27-year-old gentleman with a history of IV drug use particularly heroin with the last use being about 2 to 3 weeks ago. He is presenting with some new skin lesions to his upper extremities that has been progressively worsening to his lower extremities. He endorses chills but denies any obvious fever. He has been having lightheadedness when he tries to ambulate and states that he has some abdominal pain that has been progressively worse as well over the past few weeks. Patient is on methadone and states that has been addicted to opiates since multiple surgeries previously. He states that he has sought medical care and has been discharged on oxygen from the other hospitals. He is got some tenderness palpation in the right flank region and right abdominal region. He has superficial scabs and will have blood work and imaging studies obtained at this time.      Bryon Pineda MD  07/29/22 2014

## 2022-07-29 NOTE — ED PROVIDER NOTES
[address] [facility] ENCOUNTER          PHYSICIAN ASSISTANT NOTE       Date of evaluation: 7/29/2022    Chief Complaint     Fatigue (States [place] lot has been going on, I am being treated at a methadone clinic. I have lost a lot of weight, dont have any appetite, I have these sores all over my arms and legs. Im worried I have endocarditis. \")      History of Present Illness     Shobha Townsend is a 39 y.o. male with a history of IV drug use who is currently seen at the methadone clinic and comes to the emergency department today reporting that the methadone clinic directed him to come to the emergency department after seeing that his urine was extremely dark. He reports that he is here to rule out any kind of infection. The patient has multiple complaints including dark urine, intermittent lightheadedness, new pain and swelling in his right ankle without reported injury, cough productive of sputum ongoing for 2 weeks, and right-sided abdominal pain ongoing for 2 weeks that is achy in nature. He also complains of sores on the skin of his arms and legs that have been increasing in frequency over the past several weeks. He also endorses losing 75 pounds in the last 4 months. Patient denies any recent injuries. He denies any pain with urination. He does endorse some chills but denies any known fevers. He denies nausea or vomiting. He denies known sick contacts. He denies chest pain or shortness of breath. He reports that he has not used IV drugs in 2 weeks but has been snorting fentanyl daily. Review of Systems     Review of Systems   Constitutional:  Positive for chills. Negative for fever. Respiratory:  Positive for cough. Negative for chest tightness and shortness of breath. Cardiovascular:  Negative for chest pain. Gastrointestinal:  Positive for abdominal pain. Negative for diarrhea, nausea and vomiting. Genitourinary:  Negative for dysuria and hematuria.    Musculoskeletal: Positive for back pain. Neurological:  Positive for light-headedness. Negative for dizziness and headaches. Past Medical, Surgical, Family, and Social History     He has a past medical history of COPD (chronic obstructive pulmonary disease) ([address] and Depression. He has a past surgical history that includes hernia repair; fracture surgery; and Colon surgery. His family history is not on file. He reports that he has been smoking cigarettes. He has been smoking an average of .5 packs per day. He has never used smokeless tobacco. He reports current drug use. Drugs: Marijuana (Weed) and Opiates . He reports that he does not drink alcohol. Medications     Previous Medications    NAPROXEN (NAPROSYN) 500 MG TABLET    Take 1 tablet by mouth 2 times daily for 20 doses       Allergies     He is allergic to clindamycin/lincomycin, morphine, and other. Physical Exam     INITIAL VITALS: BP: 106/68, Temp: 98.2 °F (36.8 °C), Heart Rate: 69, Resp: 16, SpO2: 97 %  Physical Exam  Constitutional:       Appearance: Normal appearance. HENT:      Head: Normocephalic and atraumatic. Cardiovascular:      Rate and Rhythm: Normal rate and regular rhythm. Pulses: Normal pulses. Heart sounds: Normal heart sounds. Pulmonary:      Effort: Pulmonary effort is normal.      Breath sounds: Normal breath sounds. Abdominal:      Comments: Patient has moderate tenderness to palpation of the right side of his abdomen diffusely. No guarding or rigidity. No rebound tenderness. No tenderness to palpation elsewhere in the abdomen. There is no isolated McBurney's point tenderness. Musculoskeletal:         General: Normal range of motion. Cervical back: Normal range of motion. Comments: Pt has multiple areas of excoriation over his upper and lower extremities without fluctuance, induration or cellulitic appearance. Patient does have CVA tenderness present bilaterally.    Skin:     General: Skin is warm and dry.   Neurological:      Mental Status: He is alert and oriented to person, place, and time. Psychiatric:         Mood and Affect: Mood normal.         Behavior: Behavior normal.       Diagnostic Results     EKG   EKG shows normal sinus rhythm rate of 62 bpm.  Nonischemic without ST elevation or depression. NE interval 138. . . RADIOLOGY:  XR CHEST (2 VW)    (Results Pending)   XR ANKLE RIGHT (MIN 3 VIEWS)    (Results Pending)   XR TIBIA FIBULA RIGHT (2 VIEWS)    (Results Pending)       LABS:   No results found for this visit on 07/29/22. ED BEDSIDE ULTRASOUND:  No results found. RECENT VITALS:  BP: 106/68, Temp: 98.2 °F (36.8 °C), Heart Rate: 69, Resp: 16, SpO2: 97 %     Procedures         ED Course     Nursing Notes, Past Medical Hx,Past Surgical Hx, Social Hx, Allergies, and Family Hx were reviewed. The patient was given the following medications:  No orders of the defined types were placed in this encounter. CONSULTS:  None    MEDICAL DECISION MAKING / ASSESSMENT / PLAN     Katieamairani Her is a 39 y.o. male with a history of IV drug use who is currently seen at the methadone clinic and comes to the emergency department today reporting that the methadone clinic directed him to come to the emergency department after seeing that his urine was extremely dark. He reports that he is here to rule out any kind of infection. The patient has multiple complaints including dark urine, new pain and swelling in his right ankle without reported injury, cough productive of sputum ongoing for 2 weeks, and right-sided abdominal pain ongoing for 2 weeks that is achy in nature. He also complains of sores on the skin of his arms and legs that have been increasing in frequency over the past several weeks. He also endorses losing 75 pounds in the last 4 months. Patient denies any recent injuries. He denies any pain with urination. He does endorse some chills but denies any known fevers.   He denies nausea or vomiting. He denies known sick contacts. He denies chest pain or shortness of breath. He reports that he has not used IV drugs in 2 weeks but has been snorting fentanyl daily. He is alert and oriented x4. On exam the patient does have multiple areas of excoriation over his upper and lower extremities without fluctuance, induration or cellulitic appearance. Patient has moderate tenderness to palpation of the right side of his abdomen diffusely. No guarding or rigidity. No rebound tenderness. No tenderness to palpation elsewhere in the abdomen. There is no isolated McBurney's point tenderness. Lung sounds are clear to auscultation bilaterally. Patient does have CVA tenderness present bilaterally. BMP, CBC, lipase, hepatic panel are unremarkable; UA is unremarkable; hepatitis panel was obtained and pending; COVID/influenza swabs were obtained and negative. EKG shows normal sinus rhythm rate of 62 bpm.  Nonischemic without ST elevation or depression. AL interval 138. . . CXR, CT chest with IV contrast, CT abdomen pelvis with IV contrast:  CT ABDOMEN PELVIS W IV CONTRAST Additional Contrast? None   Final Result      1. No acute abdominal or pelvic abnormality identified. 2. Moderate stool retention with moderate stool the rectum, correlate for possible impaction. CT CHEST PULMONARY EMBOLISM W CONTRAST   Final Result      1. No evidence of pulmonary thromboembolism. No acute vascular abnormality in the chest.   2. Subacute fractures of the anterior left fourth and fifth ribs. 3. Mild paraseptal emphysematous changes in the lung apices. XR CHEST (2 VW)   Final Result   1. No acute disease. XR ANKLE RIGHT (MIN 3 VIEWS)   Final Result   1. Stable fractured screws along the tibial fixation plate with lucency surrounding the fixation plate and lucency along the tibial fracture line compatible with a nonunion fracture and hardware loosening. Superimposed infection is not excluded. 2. No acute abnormality ankle. XR TIBIA FIBULA RIGHT (2 VIEWS)   Final Result   1. Stable fractured screws along the tibial fixation plate with lucency surrounding the fixation plate and lucency along the tibial fracture line compatible with a nonunion fracture and hardware loosening. Superimposed infection is not excluded. 2. No acute abnormality ankle. As the patient has unremarkable laboratory work-up, is hemodynamically stable, has a nonischemic EKG, is afebrile, I feel he is appropriate for discharge home with strict return precautions. I did have a long conversation with him regarding drug use and need for follow-up with a primary care provider as well as his orthopedic surgeon with regard to his right ankle pain and prior surgery. There is low suspicion for septic joint as he is afebrile, has no signs of infection including warmth or erythema over the injured area. Given the x-ray interpretation there is some concern for minor hardware compromise and I feel follow-up with his orthopedic surgeon is important. I expressed this to him and he let me know that he is in contact with his orthopedic surgeon and can follow-up with him/her. He was discharged in stable condition with strict return precautions and advisement to follow this plan. I did provide him a referral for the [facility] outpatient clinic to establish primary care. This patient was also evaluated by the attending physician. All care plans were discussed and agreed upon. Clinical Impression     1. Abdominal pain, unspecified abdominal location        Disposition     PATIENT REFERRED TO:  No follow-up provider specified.     DISCHARGE MEDICATIONS:  New Prescriptions    No medications on file       Juventino Burkett, PA-C  07/29/22 3549

## 2022-07-30 LAB
EKG ATRIAL RATE: 62 BPM
EKG DIAGNOSIS: NORMAL
EKG P AXIS: 69 DEGREES
EKG P-R INTERVAL: 138 MS
EKG Q-T INTERVAL: 464 MS
EKG QRS DURATION: 100 MS
EKG QTC CALCULATION (BAZETT): 470 MS
EKG R AXIS: 67 DEGREES
EKG T AXIS: 51 DEGREES
EKG VENTRICULAR RATE: 62 BPM

## 2022-07-30 NOTE — DISCHARGE INSTRUCTIONS
As we discussed follow-up with your orthopedic surgeon with regard to your right ankle pain. Follow-up with the [facility] outpatient clinic with the referral provided to establish primary care. Do this as soon as possible.   Return to the emergency department with new or worsening symptoms including chest pain, shortness of breath, fevers, cough, worsening abdominal pain, blood in stool or urine or other concerning symptoms

## 2022-07-31 LAB
HAV IGM SER IA-ACNC: ABNORMAL
HEPATITIS B CORE IGM ANTIBODY: ABNORMAL
HEPATITIS B SURFACE ANTIGEN INTERPRETATION: ABNORMAL
HEPATITIS C ANTIBODY INTERPRETATION: REACTIVE